# Patient Record
Sex: MALE | Race: WHITE | Employment: FULL TIME | URBAN - METROPOLITAN AREA
[De-identification: names, ages, dates, MRNs, and addresses within clinical notes are randomized per-mention and may not be internally consistent; named-entity substitution may affect disease eponyms.]

---

## 2019-04-07 ENCOUNTER — HOSPITAL ENCOUNTER (EMERGENCY)
Facility: MEDICAL CENTER | Age: 19
End: 2019-04-07
Attending: EMERGENCY MEDICINE
Payer: COMMERCIAL

## 2019-04-07 ENCOUNTER — APPOINTMENT (OUTPATIENT)
Dept: RADIOLOGY | Facility: MEDICAL CENTER | Age: 19
End: 2019-04-07
Attending: EMERGENCY MEDICINE
Payer: COMMERCIAL

## 2019-04-07 VITALS
BODY MASS INDEX: 33.49 KG/M2 | DIASTOLIC BLOOD PRESSURE: 60 MMHG | RESPIRATION RATE: 16 BRPM | HEIGHT: 70 IN | SYSTOLIC BLOOD PRESSURE: 125 MMHG | HEART RATE: 80 BPM | WEIGHT: 233.91 LBS | TEMPERATURE: 98.2 F | OXYGEN SATURATION: 98 %

## 2019-04-07 DIAGNOSIS — S89.91XA INJURY OF RIGHT KNEE, INITIAL ENCOUNTER: ICD-10-CM

## 2019-04-07 DIAGNOSIS — S89.91XA KNEE INJURY, RIGHT, INITIAL ENCOUNTER: ICD-10-CM

## 2019-04-07 PROCEDURE — 73564 X-RAY EXAM KNEE 4 OR MORE: CPT | Mod: RT

## 2019-04-07 PROCEDURE — 99284 EMERGENCY DEPT VISIT MOD MDM: CPT

## 2019-04-07 NOTE — ED TRIAGE NOTES
Ambulates to triage  Chief Complaint   Patient presents with   • Knee Injury     Pt said he was squatting down yesterday morning, and when he got up he felt a pop to his R outer knee.  By 10 pm his knee was swollen and has had a hard time bending it.

## 2019-04-07 NOTE — ED NOTES
Chief Complaint   Patient presents with   • Knee Injury     Agree with triage RN. CMS +. Pt eating jelly beans, asked pt to stop eating until cleared by ERP.

## 2019-04-07 NOTE — DISCHARGE INSTRUCTIONS
Rest of the knee and use Tylenol and Motrin if needed for discomfort.  Follow-up with the orthopedic clinic during the week

## 2019-04-08 NOTE — ED PROVIDER NOTES
"ED Provider Note    CHIEF COMPLAINT  Chief Complaint   Patient presents with   • Knee Injury       HPI  Addy De Luna is a 19 y.o. male who presents to the emergency department complaining of right knee pain.  The patient says that yesterday he squatted down to pick something up and when he stood up he felt cracking and popping in his right knee and since that time he has had some discomfort in the knee, the knee feels a little bit swollen and he feels a cracking kind of sensation when he flexes and extends it.  He has had previous injuries to the knee but the symptoms of cracking and popping are new since yesterday.    REVIEW OF SYSTEMS no other immediate precipitating event no other joint pain or mechanism of injury    PAST MEDICAL HISTORY  History reviewed. No pertinent past medical history.    FAMILY HISTORY  History reviewed. No pertinent family history.    SOCIAL HISTORY  Social History     Social History   • Marital status: Unknown     Spouse name: N/A   • Number of children: N/A   • Years of education: N/A     Social History Main Topics   • Smoking status: Never Smoker   • Smokeless tobacco: Never Used   • Alcohol use No   • Drug use: No   • Sexual activity: Not on file     Other Topics Concern   • Not on file     Social History Narrative   • No narrative on file       SURGICAL HISTORY  History reviewed. No pertinent surgical history.    CURRENT MEDICATIONS  Home Medications     Reviewed by Nadia Boss R.N. (Registered Nurse) on 04/07/19 at 1525  Med List Status: Complete   Medication Last Dose Status        Patient Oneil Taking any Medications                       ALLERGIES  No Known Allergies    PHYSICAL EXAM  VITAL SIGNS: /60   Pulse 80   Temp 36.8 °C (98.2 °F) (Temporal)   Resp 16   Ht 1.778 m (5' 10\")   Wt 106.1 kg (233 lb 14.5 oz)   SpO2 98%   BMI 33.56 kg/m²    Oxygen saturation is interpreted as adequate  Constitutional: Awake and generally " well-appearing  Musculoskeletal: There is mild tenderness over the anterior aspect of the right knee potentially a very minimal joint effusion there is no erythema.  The patient can flex and extend the knee without much difficulty  Neurologic: Awake verbal ambulatory moving all extremities    Radiology  DX-KNEE COMPLETE 4+ RIGHT   Final Result      No evidence of acute fracture or dislocation.        MEDICAL DECISION MAKING and DISPOSITION  I reviewed the x-ray findings with the patient and given the popping and cracking sensations I think he likely has a meniscal injury.  I have offered crutches but he does not want him.  I have advised the patient he should call Dr. Goetz's office in the morning and arrange orthopedic follow-up during the week and he is to use Tylenol and Motrin if needed for pain    IMPRESSION  1.  Right knee injury         Electronically signed by: Harris Jacques, 4/7/2019 5:13 PM

## 2019-04-08 NOTE — ED NOTES
All discharge instructions given to pt.  Pt advised not to drink or drive.  Pt verbalized understanding of all discharge instructions.  All questions answered.